# Patient Record
Sex: FEMALE | Race: WHITE | NOT HISPANIC OR LATINO | URBAN - METROPOLITAN AREA
[De-identification: names, ages, dates, MRNs, and addresses within clinical notes are randomized per-mention and may not be internally consistent; named-entity substitution may affect disease eponyms.]

---

## 2017-07-24 ENCOUNTER — OFFICE (OUTPATIENT)
Dept: URBAN - METROPOLITAN AREA CLINIC 33 | Facility: CLINIC | Age: 47
End: 2017-07-24

## 2017-07-24 VITALS
TEMPERATURE: 97.5 F | DIASTOLIC BLOOD PRESSURE: 70 MMHG | WEIGHT: 113 LBS | HEIGHT: 65 IN | HEART RATE: 95 BPM | SYSTOLIC BLOOD PRESSURE: 110 MMHG

## 2017-07-24 DIAGNOSIS — R19.5 OTHER FECAL ABNORMALITIES: ICD-10-CM

## 2017-07-24 DIAGNOSIS — F41.8 OTHER SPECIFIED ANXIETY DISORDERS: ICD-10-CM

## 2017-07-24 PROCEDURE — 99213 OFFICE O/P EST LOW 20 MIN: CPT

## 2017-07-24 NOTE — SERVICEHPINOTES
Ms. Stewart is here to discuss rectal discomfort. She was dx with a fissure in March and then she saw worms in her stool. She was given Albendazole and then another medication was then given which made worms "go away". She saw her PCP who told her that her fissure had healed. She is now afraid to have a bowel movement due to her diarrhea hx and hx of fissure. She feels like her stools are fine when she takes probiotics but wanted to discuss with a professional about which probiotics to use. She doesn't want to continue stool softeners as these occur in pill form. She doesn't like pills and wants something that isn't in pill form. She also tried Metamucil for a few months but didn't like it. No blood in stool. She notes rare rectal spasms  which occur when she is passing gas only--few seconds only. No current diarrhea. In 2014, she was here for epigastric pain, dysphagia and GERD--EGD at that time showed minimal gastritis.

## 2017-10-20 ENCOUNTER — OFFICE (OUTPATIENT)
Dept: URBAN - METROPOLITAN AREA CLINIC 33 | Facility: CLINIC | Age: 47
End: 2017-10-20

## 2017-10-20 VITALS
HEART RATE: 91 BPM | HEIGHT: 65 IN | TEMPERATURE: 97.9 F | DIASTOLIC BLOOD PRESSURE: 76 MMHG | SYSTOLIC BLOOD PRESSURE: 104 MMHG | WEIGHT: 114 LBS

## 2017-10-20 DIAGNOSIS — R19.4 CHANGE IN BOWEL HABIT: ICD-10-CM

## 2017-10-20 PROCEDURE — 99214 OFFICE O/P EST MOD 30 MIN: CPT

## 2017-10-20 NOTE — SERVICEHPINOTES
46 yo female presents for f/u irregularity and rectal discomfort. She was previously treated with albendazole (2 courses) for appearance of worms in stools - she had a f/u O&ampP which she says was negative. She reports anxiety and panic attacks and struggles a great deal with excessive worry about her health. Was seen here in July and given VSL #3. She doesn't feel it has helped her. Can still have rectal discomfort, bloating, and some irregularity. She states that she wants to get everything checked out but she is afraid to have a colonoscopy due to knowing someone who had a perforation. She does report what sounds like barium-based studies done in past (in [place]). In 2014, she was here for epigastric pain, dysphagia and GERD--EGD at that time showed minimal gastritis. She is taking Prevacid for indigestion.